# Patient Record
Sex: FEMALE | URBAN - METROPOLITAN AREA
[De-identification: names, ages, dates, MRNs, and addresses within clinical notes are randomized per-mention and may not be internally consistent; named-entity substitution may affect disease eponyms.]

---

## 2022-01-05 ENCOUNTER — APPOINTMENT (RX ONLY)
Dept: URBAN - METROPOLITAN AREA CLINIC 322 | Facility: CLINIC | Age: 62
Setting detail: DERMATOLOGY
End: 2022-01-05

## 2022-01-05 DIAGNOSIS — Z41.9 ENCOUNTER FOR PROCEDURE FOR PURPOSES OTHER THAN REMEDYING HEALTH STATE, UNSPECIFIED: ICD-10-CM

## 2022-01-05 PROBLEM — Z1283 SCREENING FOR MALIGNANT NEOPLASMS OF SKIN: Status: ACTIVE | Noted: 2022-01-05

## 2022-01-05 PROBLEM — D239 BENIGN NEOPLASM OF SKIN, SITE UNSPECIFIED: Status: ACTIVE | Noted: 2022-01-05

## 2022-01-05 PROCEDURE — ? PLATELET RICH PLASMA INJECTION

## 2022-01-05 ASSESSMENT — LOCATION ZONE DERM: LOCATION ZONE: FACE

## 2022-01-05 ASSESSMENT — LOCATION DETAILED DESCRIPTION DERM: LOCATION DETAILED: LEFT MEDIAL FOREHEAD

## 2022-01-05 ASSESSMENT — LOCATION SIMPLE DESCRIPTION DERM: LOCATION SIMPLE: LEFT FOREHEAD

## 2022-01-05 NOTE — PROCEDURE: PLATELET RICH PLASMA INJECTION
Standard Default Technique For Making Prp: Pt was advised on adequate hydration and eating 24 hours before procedure.  Pt was brought in and in sterile fashion blood was drawn into tiger top tubes with 24g butterfly needles.  Tubes were mixed adequately and allowed to coagulate at room temperature for 10 min.  After 10 min, tubes were centrifuged at 3300rpm for a total of 8-10 min.  PRP was then drawn up in sterile fashion into 3cc syringes.  Pt was given the PRP to [facility] q30min for next 4 hrs until pt runs out.

## 2022-01-05 NOTE — HPI: COSMETIC (FILLERS)
Have You Had Fillers Before?: has had fillers
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  Pt states they did not know what to do and sent her to ER.  They attempted to inject hylanex but never were able to dissolve the HA filler.  Pt started to have tissue necrosis on right side of face; she has since started on antibiotics, antivirals, had consult with plastics and has started hyperbarics.  Pt is starting to improve but still has significant necrosis.  Pt has been consulting me via text/phone and going to give trial of PRP application to aid in wound healing.

## 2022-01-05 NOTE — PROCEDURE: PLATELET RICH PLASMA INJECTION
Post-Care Instructions: \\nPt is currently undergoing treatment for necrosis secondary to vascular occlusion from filler 2 weeks ago.  We discussed prp as a trial treatment with no proven results but a treatment which may enhance granulation and wound healing.  Pt is to continue f/u with [facility] for daily hyperbaric treatment and continue on doxy with mupirocin and valtrex.  Pt has already had consult with plastics about removal and necrotic tissue but they are monitoring at this time during wound healing.  We are giving prp as a trial treatment to enhance healing; this is a trial test and NO CHARGE to pt.  Pictures were taken today and pt is to f/u next week for repeat treatment. Post-Care Instructions: \\nPt is currently undergoing treatment for necrosis secondary to vascular occlusion from filler 2 weeks ago.  We discussed prp as a trial treatment with no proven results but a treatment which may enhance granulation and wound healing.  Pt is to continue f/u with [facility] for daily hyperbaric treatment and continue on doxy with mupirocin and valtrex.  Pt has already had consult with plastics about removal and necrotic tissue but they are monitoring at this time during wound healing.  We are giving prp as a trial treatment to enhance healing; this is a trial test and NO CHARGE to pt.  Pictures were taken today and pt is to f/u next week for repeat treatment.

## 2025-04-01 ENCOUNTER — APPOINTMENT (OUTPATIENT)
Dept: URBAN - METROPOLITAN AREA CLINIC 322 | Facility: CLINIC | Age: 65
Setting detail: DERMATOLOGY
End: 2025-04-01

## 2025-04-01 DIAGNOSIS — L81.4 OTHER MELANIN HYPERPIGMENTATION: ICD-10-CM

## 2025-04-01 DIAGNOSIS — L82.1 OTHER SEBORRHEIC KERATOSIS: ICD-10-CM

## 2025-04-01 DIAGNOSIS — D22 MELANOCYTIC NEVI: ICD-10-CM

## 2025-04-01 PROBLEM — D22.5 MELANOCYTIC NEVI OF TRUNK: Status: ACTIVE | Noted: 2025-04-01

## 2025-04-01 PROCEDURE — ? TREATMENT REGIMEN

## 2025-04-01 PROCEDURE — ? COUNSELING

## 2025-04-01 PROCEDURE — ? FULL BODY SKIN EXAM

## 2025-04-01 PROCEDURE — 99203 OFFICE O/P NEW LOW 30 MIN: CPT

## 2025-04-01 ASSESSMENT — LOCATION SIMPLE DESCRIPTION DERM
LOCATION SIMPLE: RIGHT FOREARM
LOCATION SIMPLE: LEFT FOREARM
LOCATION SIMPLE: ABDOMEN
LOCATION SIMPLE: LEFT CHEEK

## 2025-04-01 ASSESSMENT — LOCATION DETAILED DESCRIPTION DERM
LOCATION DETAILED: EPIGASTRIC SKIN
LOCATION DETAILED: PERIUMBILICAL SKIN
LOCATION DETAILED: RIGHT VENTRAL PROXIMAL FOREARM
LOCATION DETAILED: LEFT CENTRAL MALAR CHEEK
LOCATION DETAILED: LEFT VENTRAL PROXIMAL FOREARM

## 2025-04-01 ASSESSMENT — LOCATION ZONE DERM
LOCATION ZONE: FACE
LOCATION ZONE: TRUNK
LOCATION ZONE: ARM